# Patient Record
Sex: FEMALE | Race: OTHER | HISPANIC OR LATINO
[De-identification: names, ages, dates, MRNs, and addresses within clinical notes are randomized per-mention and may not be internally consistent; named-entity substitution may affect disease eponyms.]

---

## 2018-08-09 ENCOUNTER — APPOINTMENT (OUTPATIENT)
Dept: BARIATRICS | Facility: CLINIC | Age: 23
End: 2018-08-09

## 2019-01-30 ENCOUNTER — TRANSCRIPTION ENCOUNTER (OUTPATIENT)
Age: 24
End: 2019-01-30

## 2019-04-02 ENCOUNTER — TRANSCRIPTION ENCOUNTER (OUTPATIENT)
Age: 24
End: 2019-04-02

## 2021-01-07 ENCOUNTER — APPOINTMENT (OUTPATIENT)
Dept: BARIATRICS | Facility: CLINIC | Age: 26
End: 2021-01-07
Payer: MEDICAID

## 2021-01-07 VITALS — WEIGHT: 293 LBS | HEIGHT: 71 IN | BODY MASS INDEX: 41.02 KG/M2

## 2021-01-07 DIAGNOSIS — M54.5 LOW BACK PAIN: ICD-10-CM

## 2021-01-07 DIAGNOSIS — G89.29 PAIN IN RIGHT KNEE: ICD-10-CM

## 2021-01-07 DIAGNOSIS — M25.562 PAIN IN RIGHT KNEE: ICD-10-CM

## 2021-01-07 DIAGNOSIS — G89.29 LOW BACK PAIN: ICD-10-CM

## 2021-01-07 DIAGNOSIS — M25.561 PAIN IN RIGHT KNEE: ICD-10-CM

## 2021-01-07 PROCEDURE — 99203 OFFICE O/P NEW LOW 30 MIN: CPT | Mod: 95

## 2021-03-04 NOTE — REVIEW OF SYSTEMS
[Patient Intake Form Reviewed] : Patient intake form was reviewed [Joint Pain] : joint pain [Joint Stiffness] : joint stiffness [Negative] : Psychiatric

## 2021-03-04 NOTE — HISTORY OF PRESENT ILLNESS
[Home] : at home, [unfilled] , at the time of the visit. [Other Location: e.g. Home (Enter Location, City,State)___] : at [unfilled] [Verbal consent obtained from patient] : the patient, [unfilled] [de-identified] : Patient is a 25 year old female with a long history of morbid obesity not responsive to multiple dietary regimens. She has a BMI of 44.9 and weight-related comorbidities including obstructive sleep apnea.

## 2021-03-11 ENCOUNTER — NON-APPOINTMENT (OUTPATIENT)
Age: 26
End: 2021-03-11

## 2021-04-23 ENCOUNTER — APPOINTMENT (OUTPATIENT)
Dept: BARIATRICS | Facility: CLINIC | Age: 26
End: 2021-04-23

## 2021-06-03 ENCOUNTER — APPOINTMENT (OUTPATIENT)
Dept: BARIATRICS | Facility: CLINIC | Age: 26
End: 2021-06-03
Payer: MEDICAID

## 2021-06-03 VITALS — BODY MASS INDEX: 44.21 KG/M2 | WEIGHT: 293 LBS

## 2021-06-03 PROCEDURE — 97802 MEDICAL NUTRITION INDIV IN: CPT | Mod: 95

## 2021-08-02 ENCOUNTER — APPOINTMENT (OUTPATIENT)
Dept: BARIATRICS | Facility: CLINIC | Age: 26
End: 2021-08-02
Payer: MEDICAID

## 2021-08-02 VITALS — BODY MASS INDEX: 44.21 KG/M2 | WEIGHT: 293 LBS

## 2021-08-02 PROCEDURE — 97803 MED NUTRITION INDIV SUBSEQ: CPT | Mod: 95

## 2021-08-12 ENCOUNTER — APPOINTMENT (OUTPATIENT)
Dept: BARIATRICS | Facility: CLINIC | Age: 26
End: 2021-08-12
Payer: MEDICAID

## 2021-08-12 PROCEDURE — 97803 MED NUTRITION INDIV SUBSEQ: CPT | Mod: 95

## 2021-08-30 ENCOUNTER — APPOINTMENT (OUTPATIENT)
Dept: BARIATRICS | Facility: CLINIC | Age: 26
End: 2021-08-30
Payer: MEDICAID

## 2021-08-30 VITALS — WEIGHT: 293 LBS | BODY MASS INDEX: 44.21 KG/M2

## 2021-08-30 PROCEDURE — 97803 MED NUTRITION INDIV SUBSEQ: CPT | Mod: 95

## 2021-09-20 ENCOUNTER — APPOINTMENT (OUTPATIENT)
Dept: BARIATRICS | Facility: CLINIC | Age: 26
End: 2021-09-20
Payer: MEDICAID

## 2021-09-20 VITALS — WEIGHT: 293 LBS | BODY MASS INDEX: 44.21 KG/M2

## 2021-09-20 PROCEDURE — 97803 MED NUTRITION INDIV SUBSEQ: CPT | Mod: 95

## 2021-10-07 ENCOUNTER — APPOINTMENT (OUTPATIENT)
Dept: BARIATRICS | Facility: CLINIC | Age: 26
End: 2021-10-07

## 2021-12-23 ENCOUNTER — APPOINTMENT (OUTPATIENT)
Dept: BARIATRICS | Facility: CLINIC | Age: 26
End: 2021-12-23
Payer: MEDICAID

## 2021-12-23 VITALS — BODY MASS INDEX: 44.63 KG/M2 | WEIGHT: 293 LBS

## 2021-12-23 PROCEDURE — 97803 MED NUTRITION INDIV SUBSEQ: CPT | Mod: 95

## 2022-01-03 ENCOUNTER — APPOINTMENT (OUTPATIENT)
Dept: BARIATRICS | Facility: CLINIC | Age: 27
End: 2022-01-03
Payer: MEDICAID

## 2022-01-03 PROCEDURE — 97803 MED NUTRITION INDIV SUBSEQ: CPT | Mod: 95

## 2022-01-10 ENCOUNTER — APPOINTMENT (OUTPATIENT)
Dept: BARIATRICS | Facility: CLINIC | Age: 27
End: 2022-01-10

## 2022-02-03 ENCOUNTER — APPOINTMENT (OUTPATIENT)
Dept: BARIATRICS | Facility: CLINIC | Age: 27
End: 2022-02-03
Payer: MEDICAID

## 2022-02-03 PROCEDURE — 99442: CPT | Mod: 95

## 2022-02-23 ENCOUNTER — OUTPATIENT (OUTPATIENT)
Dept: OUTPATIENT SERVICES | Facility: HOSPITAL | Age: 27
LOS: 1 days | End: 2022-02-23
Payer: MEDICAID

## 2022-02-23 ENCOUNTER — RESULT REVIEW (OUTPATIENT)
Age: 27
End: 2022-02-23

## 2022-02-23 ENCOUNTER — APPOINTMENT (OUTPATIENT)
Dept: RADIOLOGY | Facility: HOSPITAL | Age: 27
End: 2022-02-23
Payer: MEDICAID

## 2022-02-23 PROCEDURE — 74240 X-RAY XM UPR GI TRC 1CNTRST: CPT

## 2022-02-23 PROCEDURE — 74240 X-RAY XM UPR GI TRC 1CNTRST: CPT | Mod: 26

## 2022-03-07 ENCOUNTER — APPOINTMENT (OUTPATIENT)
Dept: BARIATRICS | Facility: CLINIC | Age: 27
End: 2022-03-07
Payer: MEDICAID

## 2022-03-07 PROCEDURE — 97803 MED NUTRITION INDIV SUBSEQ: CPT | Mod: 95

## 2022-03-17 ENCOUNTER — APPOINTMENT (OUTPATIENT)
Dept: BARIATRICS | Facility: CLINIC | Age: 27
End: 2022-03-17
Payer: MEDICAID

## 2022-03-17 VITALS — WEIGHT: 293 LBS | BODY MASS INDEX: 44.63 KG/M2

## 2022-03-17 PROCEDURE — 97803 MED NUTRITION INDIV SUBSEQ: CPT | Mod: 95

## 2022-03-31 ENCOUNTER — APPOINTMENT (OUTPATIENT)
Dept: BARIATRICS | Facility: CLINIC | Age: 27
End: 2022-03-31
Payer: MEDICAID

## 2022-03-31 PROCEDURE — 97803 MED NUTRITION INDIV SUBSEQ: CPT | Mod: 95

## 2022-04-10 NOTE — HISTORY OF PRESENT ILLNESS
[Home] : at home, [unfilled] , at the time of the visit. [Other Location: e.g. Home (Enter Location, City,State)___] : at [unfilled] [Verbal consent obtained from patient] : the patient, [unfilled] [de-identified] : Patient is preop fro a laparoscopic sleeve gastrectomy. She is working with our dietitian to fulfill her insurance requirements. She is going for her upper GI study this month.

## 2022-04-10 NOTE — ASSESSMENT
[FreeTextEntry1] : She was told to continue to work with our dietitian and to follow up with our surgical coordinator to ensure that she is completing her preop workup.

## 2022-04-20 ENCOUNTER — APPOINTMENT (OUTPATIENT)
Dept: BARIATRICS | Facility: CLINIC | Age: 27
End: 2022-04-20
Payer: MEDICAID

## 2022-04-20 PROCEDURE — 97803 MED NUTRITION INDIV SUBSEQ: CPT | Mod: 95

## 2022-04-27 ENCOUNTER — APPOINTMENT (OUTPATIENT)
Dept: BARIATRICS | Facility: CLINIC | Age: 27
End: 2022-04-27

## 2022-05-04 ENCOUNTER — APPOINTMENT (OUTPATIENT)
Dept: BARIATRICS | Facility: CLINIC | Age: 27
End: 2022-05-04
Payer: MEDICAID

## 2022-05-04 PROCEDURE — 97803 MED NUTRITION INDIV SUBSEQ: CPT | Mod: 95

## 2022-05-11 ENCOUNTER — APPOINTMENT (OUTPATIENT)
Dept: BARIATRICS | Facility: CLINIC | Age: 27
End: 2022-05-11
Payer: MEDICAID

## 2022-05-11 PROCEDURE — 97803 MED NUTRITION INDIV SUBSEQ: CPT | Mod: 95

## 2022-05-26 ENCOUNTER — APPOINTMENT (OUTPATIENT)
Dept: BARIATRICS | Facility: CLINIC | Age: 27
End: 2022-05-26

## 2022-05-26 VITALS — BODY MASS INDEX: 46.03 KG/M2 | WEIGHT: 293 LBS

## 2022-05-26 DIAGNOSIS — G47.33 OBSTRUCTIVE SLEEP APNEA (ADULT) (PEDIATRIC): ICD-10-CM

## 2022-05-26 PROCEDURE — 97803 MED NUTRITION INDIV SUBSEQ: CPT | Mod: 95

## 2022-06-08 ENCOUNTER — APPOINTMENT (OUTPATIENT)
Dept: BARIATRICS | Facility: CLINIC | Age: 27
End: 2022-06-08

## 2022-06-16 ENCOUNTER — APPOINTMENT (OUTPATIENT)
Dept: BARIATRICS | Facility: CLINIC | Age: 27
End: 2022-06-16

## 2022-06-16 DIAGNOSIS — E66.01 MORBID (SEVERE) OBESITY DUE TO EXCESS CALORIES: ICD-10-CM

## 2022-06-16 PROCEDURE — 97803 MED NUTRITION INDIV SUBSEQ: CPT | Mod: 95

## 2022-09-14 ENCOUNTER — APPOINTMENT (OUTPATIENT)
Dept: BARIATRICS | Facility: CLINIC | Age: 27
End: 2022-09-14